# Patient Record
Sex: FEMALE | Race: BLACK OR AFRICAN AMERICAN | Employment: PART TIME | URBAN - METROPOLITAN AREA
[De-identification: names, ages, dates, MRNs, and addresses within clinical notes are randomized per-mention and may not be internally consistent; named-entity substitution may affect disease eponyms.]

---

## 2020-03-04 ENCOUNTER — HOSPITAL ENCOUNTER (OUTPATIENT)
Dept: MAMMOGRAPHY | Age: 60
Discharge: HOME | End: 2020-03-04
Attending: NURSE PRACTITIONER
Payer: COMMERCIAL

## 2020-03-04 DIAGNOSIS — D17.1 BREAST LIPOMA: ICD-10-CM

## 2020-03-04 PROCEDURE — 76642 ULTRASOUND BREAST LIMITED: CPT

## 2022-05-10 NOTE — PROGRESS NOTES
ASSESSMENT/PLAN:  Below is the assessment and plan developed based on review of pertinent history, physical exam, labs, studies, and medications. 1. Knee pain, unspecified chronicity, unspecified laterality      No follow-ups on file. In discussion with the patient, we considered the numerus possible diagnoses that could be contributing to their present symptoms. We also deliberated on the extensive management options that must be considered to treat their current condition. We reviewed their accessible prior medical records, diagnostic tests, and current health and employment information. We considered how these symptoms were affecting the patient´s activities of daily living as well as employment and fitness activities. The patient had various questions regarding the possible risks, benefits, complications, morbidity and mortality regarding their diagnosis and treatment options. The patients´ comorbidities were considered, and I advocated that they consider maximizing lifestyle modification through nutrition and exercise to aid in addressing their symptoms. Shared decision making yielded an understanding to move forward with conservation treatment preferences. The patient expressed understanding that if conservative management fails to alleviate the present symptoms they will return to office for re-evaluation and consideration of additional diagnostic tests and potential surgical options. In the interim, we have recommended ice, elevation, and take prescription anti-inflammatory medications along with a physician directed home exercise program. We discussed the risks and common side effects of anti-inflammatory medications and instructed the patient to discontinue the medication and contact us if they experienced any side effects. The patient was encouraged to discuss the possible side effects with their family physician or pharmacist prior to initiating any new medications.     We discussed the fact that many of the recommended treatment options presented are significantly limited by the patient´s social determinants of health. We also reviewed the circumstances surrounding the environment that they live and work which affect a wide range of health risk. We considered the limited access to appropriate educational resources regarding proper nutrition and exercise as well as the economic and social support necessary to maintain health and wellbeing. We discussed the possibility of an injection of a steroid mixed with a local anesthetic to relieve pain and decrease inflammation in the right knee. The risks of a steroid injection which include but are not limited to cartilage damage, death of nearby bone, joint infection, nerve damage, temporary facial flushing, temporary steroid flare of pain and inflammation in the joint, temporary increase in blood sugar, tendon weakening or rupture, thinning of nearby bone (osteoporosis), thinning of skin and soft tissue around the injection site, and whitening or lightening of the skin around the injection site were reviewed at length. We specifically advised that patients with diabetes talk to their primary care to ensure they are safe for a steroid injection due to the transient increase in blood sugar associated with the injection. We discussed the chance of increased bleeding and bruising if the patient is on blood thinners or certain dietary supplements that have a blood-thinning effect. We advised patients that have an active infection, history of allergic reactions to steroids or take medications that may prohibit them from receiving a steroid injection to talk to their primary care physician before receiving and injection. We also talked about limiting the number of injections because these potential side effects increase with larger doses and repeated use.     After explaining the risks and benefits of the procedure and obtaining verbal informed consent from the patient, the proposed area for injection was confirmed with the patient. After all questions and concerns were addressed, the skin was prepped with alcohol to reduce the chances of infection. The skin was anesthetized with a topical ethylene chloride spray and a mixture of two milliliters of Depo-Medrol (40mg/ml), one milliliter of Lidocaine and one milliliter of Marcaine was injected slowly into the intra-articular space of right knee in a sterile fashion without difficulty. The needle was removed and disposed of in a sterile container. The patient tolerated the injection well and a band-aid was placed on the skin. The patient was counseled on protecting the injection area, avoiding water submersion for 2 days, icing for pain relief and looking for signs and symptoms of infection. We requested that the patient contact us if any symptoms persist greater than 48 hours after the injection. 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 We will see how the right knee responds to an injection prior to proceeding with the left knee. Should continue her home exercise program.    SUBJECTIVE/OBJECTIVE:  Hiwot Joy (: 1960) is a 63 y.o. female, patient,here for evaluation of the No chief complaint on file. .   Of note, the patient was treated previously at [facility] extensively for patellofemoral arthritis of the knees. She continues to suffer from patellofemoral arthritis. She tried physical therapy as well as braces. She is taken multiple medications. She denies any swelling or locking or give way. She denies any numbness or tingling. She reports rest does make it better but active makes it worse. Physical Exam    Upon physical examination, the patient is well developed, well nourished, alert and oriented times three, with normal mood and affect and walks with an antalgic gait.     Upon examination of the right knee, the patient is nontender to palpation along the medial and lateral joint lines, and has no effusion. They are tender to palpation along the medial and lateral facets of the patella. They have crepitus of the patellofemoral joint with range of motion and discomfort with patella grind testing. The patient has no discomfort with Antonette´s maneuvers, and the knee is stable. They have full range of motion. They have 5/5 strength, and are neurovascularly intact distally. There is no erythema, warmth or skin lesions present. On examination of the contralateral extremity, the patient is nontender to palpation and has excellent range of motion, stability and strength. Imagin views of both knees demonstrate some early arthritic changes in the patellofemoral joint. No Known Allergies    Current Outpatient Medications   Medication Sig    ibuprofen (MOTRIN) 600 mg tablet Take 1 Tab by mouth every six (6) hours as needed for Pain.  HYDROcodone-acetaminophen (NORCO) 7.5-325 mg per tablet Take 1 Tab by mouth every six (6) hours as needed for Pain for 10 doses. No current facility-administered medications for this visit. No past medical history on file. Past Surgical History:   Procedure Laterality Date    HX OTHER SURGICAL      lump removal from her left breast       No family history on file.     Social History     Socioeconomic History    Marital status: SINGLE     Spouse name: Not on file    Number of children: Not on file    Years of education: Not on file    Highest education level: Not on file   Occupational History    Not on file   Tobacco Use    Smoking status: Never Smoker    Smokeless tobacco: Not on file   Substance and Sexual Activity    Alcohol use: No    Drug use: Not on file    Sexual activity: Not on file   Other Topics Concern    Not on file   Social History Narrative    Not on file     Social Determinants of Health     Financial Resource Strain:     Difficulty of Paying Living Expenses: Not on file   Food Insecurity:     Worried About [facility] in the Last Year: Not on file    Radha of Food in the Last Year: Not on file   Transportation Needs:     Lack of Transportation (Medical): Not on file    Lack of Transportation (Non-Medical): Not on file   Physical Activity:     Days of Exercise per Week: Not on file    Minutes of Exercise per Session: Not on file   Stress:     Feeling of Stress : Not on file   Social Connections:     Frequency of Communication with Friends and Family: Not on file    Frequency of Social Gatherings with Friends and Family: Not on file    Attends [employer] Services: Not on file    Active Member of [address] [place] [address] or Organizations: Not on file    Attends Club or Organization Meetings: Not on file    Marital Status: Not on file   Intimate Partner Violence:     Fear of Current or Ex-Partner: Not on file    Emotionally Abused: Not on file    Physically Abused: Not on file    Sexually Abused: Not on file   Housing Stability:     Unable to Pay for Housing in the Last Year: Not on file    Number of [place] in the Last Year: Not on file    Unstable Housing in the Last Year: Not on file       Review of Systems    No flowsheet data found. Vitals: There were no vitals taken for this visit. There is no height or weight on file to calculate BMI. An electronic signature was used to authenticate this note.   -- Merari Barbosa MD

## 2022-05-11 ENCOUNTER — OFFICE VISIT (OUTPATIENT)
Dept: ORTHOPEDIC SURGERY | Age: 62
End: 2022-05-11
Payer: COMMERCIAL

## 2022-05-11 VITALS — BODY MASS INDEX: 21.5 KG/M2 | WEIGHT: 133.8 LBS | HEIGHT: 66 IN

## 2022-05-11 DIAGNOSIS — M25.569 KNEE PAIN, UNSPECIFIED CHRONICITY, UNSPECIFIED LATERALITY: Primary | ICD-10-CM

## 2022-05-11 DIAGNOSIS — M22.41 CHONDROMALACIA OF PATELLOFEMORAL JOINT, RIGHT: ICD-10-CM

## 2022-05-11 PROCEDURE — 20610 DRAIN/INJ JOINT/BURSA W/O US: CPT | Performed by: ORTHOPAEDIC SURGERY

## 2022-05-11 PROCEDURE — 99204 OFFICE O/P NEW MOD 45 MIN: CPT | Performed by: ORTHOPAEDIC SURGERY

## 2022-05-11 RX ORDER — TRIAMCINOLONE ACETONIDE 40 MG/ML
40 INJECTION, SUSPENSION INTRA-ARTICULAR; INTRAMUSCULAR ONCE
Status: COMPLETED | OUTPATIENT
Start: 2022-05-11 | End: 2022-05-11

## 2022-05-11 RX ORDER — BUPIVACAINE HYDROCHLORIDE 5 MG/ML
2 INJECTION, SOLUTION EPIDURAL; INTRACAUDAL ONCE
Status: COMPLETED | OUTPATIENT
Start: 2022-05-11 | End: 2022-05-11

## 2022-05-11 RX ADMIN — BUPIVACAINE HYDROCHLORIDE 10 MG: 5 INJECTION, SOLUTION EPIDURAL; INTRACAUDAL at 17:01

## 2022-05-11 RX ADMIN — TRIAMCINOLONE ACETONIDE 40 MG: 40 INJECTION, SUSPENSION INTRA-ARTICULAR; INTRAMUSCULAR at 17:02

## 2022-11-17 NOTE — PROGRESS NOTES
Neurology Note    Patient ID:  Cristian Daniel  [id number]  64 y.o.  1960      Date of Consultation:  November 18, 2022    Referring Physician: Krystal Martini NP    Reason for Consultation: headaches      Assessment and Plan:    The patient is a pleasant 65-year-old female with a 40+ year history of chronic, intractable migraine headaches. Her neurological examination revealed no focal deficits. Prior neuroimaging was unremarkable. Chronic, intractable, episodic migraines:    Known triggers for her include stress and anxiety as well as noxious smells. She is struggling with her stress and depression. She became tearful multiple times throughout the visit today. Prior medications which have been tried include nortriptyline and Lyrica with no clear benefit. It is unclear how long she actually took those medications. I did discuss with her treatment of migraines including both prophylactic and abortive care. I discussed different medications for prophylaxis. Is unclear how long she actually took the nortriptyline and Lyrica for. She is not interested in taking a medication daily. After lengthy discussion, I do think that her stress and depression is contributing a lot to her symptoms and she would like to proceed with treatment of this first and see how her headaches are after this treatment. I did give her information about different counseling that could be available. She will schedule a visit with her primary care provider to discuss medications for her depression. Risk factors for migraines were reviewed with the patient. These include lifestyle modifications, improving exercise, receiving adequate sleep, and monitoring for food triggers. A patient log of migraine frequency, intensity, and possible triggers should be recorded. Lumbar degenerative spine disease: There is no surgical intervention. Arthritis             Subjective: I have headaches.         History of Present Illness:   Blayne Rodríguez is a 63 y.o. female who was referred to the neurology clinic at [facility] for an evaluation of headaches. Patient reports that the headaches are predominantly left-sided. They are associated with photophobia and sonophobia. She does have nausea associated with them but does not vomit. They are variable in their frequency and duration. She can have them daily for 1 to 2 weeks only have them a couple times a month. They can last for few hours to all day. Stress and anxiety are a big trigger for her. Perfume can also be a trigger. They have started in her teenage years and have been constant since then. She is not sure what medicines have been tried but none have provided her any relief. She becomes tearful during the visit today due to multiple stressors in her life. She has not gotten over the death of her parents. Her work also causes her a tremendous amount of stress. When talking about work today she becomes very tearful. Upon review of the medical records, the patient was being followed by neurology at [facility]. She was last seen in clinic on February 10, 2022. At that time she was being followed by a peripheral neuropathy as well as headaches. It appears that she had been prescribed nortriptyline to help with headaches but she had not picked up the medication. She did have an EMG/nerve conduction study which revealed chronic changes in the vastus lateralis and tibialis anterior. MRI of her lumbar spine revealed degenerative changes which was worst at L4-L5. It was felt that the patient had a length dependent small fiber neuropathy as well as mild degenerative disease of her lumbar spine. At that time she was started on Lyrica to help with her pain. Laboratory test for small fiber neuropathy including HIV, hemoglobin A1c, SPEP, HILDA and rheumatoid factor were all negative. Reports that she still does have numbness and tingling in her feet. She did not take the Lyrica as she did not feel it was helping. She is uncertain as to how long she did try the nortriptyline for. She still does have significant amount of back pain which she has had since 2016. There is no surgery that was required. She reports that she does have a very erratic sleep schedule. She has very little in the way of caffeine use. She does not have a dedicated exercise program.  She has significant arthritis pain in her knees as well as in her arms. Past Medical History:   Diagnosis Date    Arthritis     Hypertension  Knee arthritis - wears knee braces on both sides. Past Surgical History:   Procedure Laterality Date    HX OTHER SURGICAL      lump removal from her left breast        Family History   Problem Relation Age of Onset    Heart Attack Mother     Pneumonia Father     No history of headaches in her family. Mother - diabetes. Social History     Tobacco Use    Smoking status: Never    Smokeless tobacco: Never   Substance Use Topics    Alcohol use: No        Allergies   Allergen Reactions    Tramadol Other (comments)     Reaction Type: Allergy; Severity: Severe; Reaction(s): Cold sweat,Dizziness,Nausea and vomiting      Candesartan Other (comments)     Reaction Type: Allergy; Reaction(s): epigastric pain    Lisinopril Other (comments)     Reaction Type: Allergy; Reaction(s): lip blistering    Naproxen Other (comments)     Reaction Type: Allergy; Reaction(s): Aching headache        Prior to Admission medications    Medication Sig Start Date End Date Taking? Authorizing Provider   ascorbic acid, vitamin C, (Vitamin C) 500 mg tablet Take  by mouth. Yes Provider, Historical   cyanocobalamin (Vitamin B-12) 1,000 mcg tablet Take 1,000 mcg by mouth daily. Yes Provider, Historical   multivitamin (MULTIPLE VITAMIN PO) Take  by mouth.    Yes Provider, Historical       Review of Systems:    General, constitutional: negative  Eyes, vision: negative  Ears, nose, throat: negative  Cardiovascular, heart: negative  Respiratory: negative  Gastrointestinal: negative  Genitourinary: negative  Musculoskeletal: negative  Skin and integumentary: negative  Psychiatric: negative  Endocrine: negative  Neurological: negative, except for HPI  Hematologic/lymphatic: negative  Allergy/immunology: negative      Objective:     Visit Vitals  /80 (BP 1 Location: Left upper arm, BP Patient Position: Sitting, BP Cuff Size: Large adult)   Pulse 86   Temp 98.2 °F (36.8 °C) (Temporal)   Resp 16   Ht 5' 6\" (1.676 m)   Wt 141 lb 9.6 oz (64.2 kg)   SpO2 97%   BMI 22.85 kg/m²       Physical Exam:    General:  appears well nourished in no acute distress. She has a very flat affect and is depressed. She becomes tearful multiple times during the examination  Neck: no carotid bruits  Lungs: clear to auscultation  Heart:  no murmurs, regular rate  Lower extremity: peripheral pulses palpable and no edema  Skin: intact    Neurological exam:    Awake, alert, oriented to person, place and time  Recent and remote memory were normal  Attention and concentration were intact  Language was intact. There was no aphasia  Speech: no dysarthria  Fund of knowledge was preserved    Cranial nerves:   II-XII were tested    Perrrla  Fundoscopic examination revealed venous pulsations and no clear abnormalities  Visual fields were full  Eomi, no evidence of nystagmus  Facial sensation:  normal and symmetric  Facial motor: normal and symmetric  Hearing intact  SCM strength intact  Tongue: midline without fasciculations    Motor: Tone normal  Pronator drift was absent  No evidence of fasciculations    Strength testing:   deltoid triceps biceps Wrist ext. Wrist flex. intrinsics Hip flex. Hip ext. Knee ext.   Knee flex Dorsi flex Plantar flex   Right 5 5 5 5 5 5 5 5 5 5 5 5   Left 5 5 5 5 5 5 5 5 5 5 5 5     She does have good strength but difficulty in sustaining a contraction due to diffuse pain in her joints and throughout her arms and legs. Sensory:  Upper extremity: intact to pp, light touch, and vibration > 10 seconds  Lower extremity: intact to pp, light touch, and vibration > 10 seconds. I did not appreciate a sensory deficit in her lower extremities. Reflexes:    Right Left  Biceps  2 2  Triceps 2 2  Brachiorad. 2 2  Patella  NT NT  Achilles 2 2    Patellar reflexes not tested as patient has significant arthritis with braces on does not want to be tested. Plantar response:  flexor bilaterally      Cerebellar testing:  no tremor apparent, finger/nose and twan were intact    Romberg: absent      Gait: steady. Slow. Antalgic. She does use a cane. Labs:     No results found for: HBA1C, NA, K, CL, GLU, BUN, CREA, CA, WBC, HCT, HGB, PLT, LDL, WAT4GYBG, HCTEXT, HGBEXT, PLTEXT, IGQ4YKAM, HCTEXT, HGBEXT, PLTEXT    Imaging:    No results found for this or any previous visit. No results found for this or any previous visit. There is no problem list on file for this patient. The patient should return to clinic in 6 to 9 months    Renewed medication: None    I spent  60  minutes on the day of the encounter preparing the office visit by reviewing medical records, obtaining a history, performing examination, counseling and educating the patient on diagnosis, documenting in the clinical medical record, and coordinating the care for the patient. The patient had the ability to ask questions and all questions were answered.           Signed By:  Bre Carvajal DO FAAN    November 18, 2022

## 2022-11-18 ENCOUNTER — OFFICE VISIT (OUTPATIENT)
Dept: NEUROLOGY | Age: 62
End: 2022-11-18
Payer: COMMERCIAL

## 2022-11-18 VITALS
HEART RATE: 86 BPM | TEMPERATURE: 98.2 F | RESPIRATION RATE: 16 BRPM | OXYGEN SATURATION: 97 % | SYSTOLIC BLOOD PRESSURE: 120 MMHG | BODY MASS INDEX: 22.76 KG/M2 | HEIGHT: 66 IN | DIASTOLIC BLOOD PRESSURE: 80 MMHG | WEIGHT: 141.6 LBS

## 2022-11-18 DIAGNOSIS — G43.019 INTRACTABLE MIGRAINE WITHOUT AURA AND WITHOUT STATUS MIGRAINOSUS: Primary | ICD-10-CM

## 2022-11-18 DIAGNOSIS — F32.A DEPRESSION, UNSPECIFIED DEPRESSION TYPE: ICD-10-CM

## 2022-11-18 PROCEDURE — 99205 OFFICE O/P NEW HI 60 MIN: CPT | Performed by: PSYCHIATRY & NEUROLOGY

## 2022-11-18 RX ORDER — LANOLIN ALCOHOL/MO/W.PET/CERES
1000 CREAM (GRAM) TOPICAL DAILY
COMMUNITY

## 2022-11-18 RX ORDER — ASCORBIC ACID 500 MG
TABLET ORAL
COMMUNITY

## 2022-11-18 NOTE — LETTER
11/18/2022    Patient: Cristian Daniel   YOB: 1960   Date of Visit: 11/18/2022     Krystal Martini NP  [facility] [address] [place] [id number]  Via Fax: [phone]    Dear Krystal Martini NP,      Thank you for referring Ms. Wes Arevalo to [address] [place] [address] for evaluation. My notes for this consultation are attached. If you have questions, please do not hesitate to call me. I look forward to following your patient along with you.       Sincerely,    Dontae Adams, DO

## 2022-12-08 ENCOUNTER — TRANSCRIBE ORDER (OUTPATIENT)
Dept: SCHEDULING | Age: 62
End: 2022-12-08

## 2022-12-08 DIAGNOSIS — R10.30 LOWER ABDOMINAL PAIN: Primary | ICD-10-CM

## 2023-03-29 ENCOUNTER — TRANSCRIBE ORDER (OUTPATIENT)
Dept: SCHEDULING | Age: 63
End: 2023-03-29

## 2023-04-07 ENCOUNTER — TRANSCRIBE ORDER (OUTPATIENT)
Dept: SCHEDULING | Age: 63
End: 2023-04-07

## 2023-05-26 RX ORDER — ASCORBIC ACID 500 MG
TABLET ORAL
COMMUNITY

## 2023-08-08 ENCOUNTER — OFFICE VISIT (OUTPATIENT)
Age: 63
End: 2023-08-08
Payer: COMMERCIAL

## 2023-08-08 VITALS
BODY MASS INDEX: 25.07 KG/M2 | HEART RATE: 78 BPM | OXYGEN SATURATION: 97 % | WEIGHT: 156 LBS | DIASTOLIC BLOOD PRESSURE: 74 MMHG | HEIGHT: 66 IN | RESPIRATION RATE: 15 BRPM | SYSTOLIC BLOOD PRESSURE: 110 MMHG

## 2023-08-08 DIAGNOSIS — G47.00 INSOMNIA, UNSPECIFIED TYPE: ICD-10-CM

## 2023-08-08 DIAGNOSIS — G43.809 OTHER MIGRAINE WITHOUT STATUS MIGRAINOSUS, NOT INTRACTABLE: Primary | ICD-10-CM

## 2023-08-08 DIAGNOSIS — G62.9 PERIPHERAL POLYNEUROPATHY: ICD-10-CM

## 2023-08-08 PROBLEM — G43.909 MIGRAINE: Status: ACTIVE | Noted: 2023-08-08

## 2023-08-08 PROCEDURE — 99215 OFFICE O/P EST HI 40 MIN: CPT

## 2023-08-08 RX ORDER — VITAMIN E 268 MG
400 CAPSULE ORAL DAILY
COMMUNITY

## 2023-08-08 RX ORDER — RIMEGEPANT SULFATE 75 MG/75MG
75 TABLET, ORALLY DISINTEGRATING ORAL DAILY PRN
Qty: 16 TABLET | Refills: 11 | Status: SHIPPED | OUTPATIENT
Start: 2023-08-08

## 2023-08-08 ASSESSMENT — PATIENT HEALTH QUESTIONNAIRE - PHQ9
SUM OF ALL RESPONSES TO PHQ QUESTIONS 1-9: 0
SUM OF ALL RESPONSES TO PHQ QUESTIONS 1-9: 0
2. FEELING DOWN, DEPRESSED OR HOPELESS: 0
SUM OF ALL RESPONSES TO PHQ9 QUESTIONS 1 & 2: 0
SUM OF ALL RESPONSES TO PHQ QUESTIONS 1-9: 0
SUM OF ALL RESPONSES TO PHQ QUESTIONS 1-9: 0
1. LITTLE INTEREST OR PLEASURE IN DOING THINGS: 0

## 2023-08-08 ASSESSMENT — ENCOUNTER SYMPTOMS
ALLERGIC/IMMUNOLOGIC NEGATIVE: 1
ABDOMINAL PAIN: 1
RESPIRATORY NEGATIVE: 1
PHOTOPHOBIA: 1

## 2023-08-08 NOTE — ASSESSMENT & PLAN NOTE
Patient verbalized experiencing insomnia due to abdominal pain. She has been followed by GI at [facility] for abdominal issues. She is to continue to follow with GI as appropriate. For insomnia, patient does not like to take medications. Patient was advised to try melatonin over-the-counter to see if that helps. She can try melatonin 3 mg and can titrate up to 9 mg or she can try melatonin 5 mg and can titrate up to 10 mg. She verbalized understanding and agreed with plan of care. Sleep hygiene was discussed with patient. If patient continues to experience sleep disturbances, may consider referral to pulmonology for evaluation of sleep apnea. Sleep Hygiene Recommendations  i. Avoid caffeine within 4-6 hours of bedtime  ii. Avoid the use of nicotine close to bedtime or during the night  iii. Do not drink alcoholic beverages within 4-6 hours of bedtime  iv. While a light snack can promote sound sleep, avoid large meals 2-3 hours before bedtime  v. Minimize light, noise, and extreme temperature in the bedroom. Stimulus Control Recommendations:   i. Lie down at night - only when sleepy  ii. Use the bed only for actual sleep (or sex) - not reading, watching TV, etc.  iii. Get out of bed if you wake up at night & cannot fall back asleep, return to bed when sleepy;  do not remain in bed awake for longer than 10 - 15 minutes  iv. Avoid napping during the day  v. Adhere to a strict morning rising time, regardless of how much sleep you got the night before.

## 2023-08-08 NOTE — ASSESSMENT & PLAN NOTE
Patient continues to experience migraine once a month and last several hours to 2 days. She denied starting on a preventative at this time. Since she has 1 migraine headache per month, we will start her on Nurtec 75 mg as needed as an abortive. Triptans were considered but deferred due to patient's history of chest pain. No additional imaging recommendation needed at this time. I personally reviewed patient's imaging from [facility]. - Head CT without contrast completed on 2/10/2023 was unremarkable. - Brain MRI without contrast COVID on 6/22 2022 showed no acute intracranial abnormality. We discussed the importance of a proper diet including plenty of fruits and vegetables as this can help with headache prevention. We discussed the importance of staying hydrated and drinking at least 32 to 64 ounces of water daily as this can be a cause of tension type headaches. We also discussed the importance of keeping a headache journal or log to identify triggers.     We discussed the importance of sleep hygiene and attempting to get at least 6 to 8 hours of sleep daily to help with headache prevention

## 2023-08-08 NOTE — ASSESSMENT & PLAN NOTE
Patient has been followed for neuropathy at [facility]. She verbalized this hospital is far from her house. Will defer neuropathy management to [facility] and patient is to continue to follow-up with [facility] neurology as appropriate. Fall safety was discussed with patient. 26-Feb-2020 16:28

## 2023-08-08 NOTE — PATIENT INSTRUCTIONS
As per our discussion,    I understand you do not want to take any medication for your migraine headaches. Since you only have 1 migraine per month, we will try to put you on Nurtec 75 mg as needed. This will need to get authorized by your insurance so he may take it in a while. If the headaches are getting worse and more frequent, may consider starting you on a medication in which she would take every day as a preventative. As for your sleep difficulty, I would advise you that you try melatonin over-the-counter. You can use melatonin 3 mg or 5 mg over-the-counter to see if that helps. As we discussed,  The importance of a proper diet including plenty of fruits and vegetables as this can help with headache prevention. The importance of staying hydrated and drinking at least 32 to 64 ounces of water daily as this can be a cause of tension type headaches. The importance of keeping a headache journal or log to identify triggers. The importance of sleep hygiene and attempting to get at least 6 to 8 hours of sleep daily to help with headache prevention                                  Sleep Hygiene Recommendations  Avoid caffeine within 4-6 hours of bedtime  Avoid the use of nicotine close to bedtime or during the night  Do not drink alcoholic beverages within 4-6 hours of bedtime  While a light snack can promote sound sleep, avoid large meals 2-3 hours before bedtime  Minimize light, noise, and extreme temperature in the bedroom. Stimulus Control Recommendations:   Lie down at night - only when sleepy  Use the bed only for actual sleep (or sex) - not reading, watching TV, etc.  Get out of bed if you wake up at night & cannot fall back asleep, return to bed when sleepy;  do not remain in bed awake for longer than 10 - 15 minutes  Avoid napping during the day  Adhere to a strict morning rising time, regardless of how much sleep you got the night before.      It was a pleasure

## 2023-08-08 NOTE — PROGRESS NOTES
1. Have you been to the ER, urgent care clinic since your last visit? Hospitalized since your last visit? No.    2. Have you seen or consulted any other health care providers outside of the [address] [place] [address] since your last visit? Include any pap smears or colon screening. [place] [facility]         Chief Complaint   Patient presents with    Migraine     Notices headaches start if not sleeping, or not eating, or strong smells
for this visit. Past medical history/surgical history, family history, and social history have been reviewed for today's visit      Review of Systems   Constitutional: Negative. HENT: Negative. Eyes:  Positive for photophobia (with headaches). Respiratory: Negative. Cardiovascular:  Positive for chest pain. Gastrointestinal:  Positive for abdominal pain (EGD test done). Endocrine: Negative. Genitourinary: Negative. Musculoskeletal:  Positive for neck pain (with headaches). Skin: Negative. Allergic/Immunologic: Negative. Neurological:  Positive for weakness (bilateral lower extremity) and headaches. Psychiatric/Behavioral:  Positive for sleep disturbance. A ten system review of constitutional, cardiovascular, respiratory, musculoskeletal, endocrine, skin, SHEENT, genitourinary, psychiatric and neurologic systems was obtained and is unremarkable except as mentioned under HPI          EXAMINATION:     Vitals:    08/08/23 1503   BP: 110/74   Site: Left Upper Arm   Position: Sitting   Cuff Size: Medium Adult   Pulse: 78   Resp: 15   SpO2: 97%   Weight: 156 lb (70.8 kg)   Height: 5' 6\" (1.676 m)          General appearance: Patient is well-developed and well-nourished in no apparent distress and well groomed. Psych/mental health:  Affect: Appropriate    PHQ  No flowsheet data found.     HEENT:   Normocephalic  With evidence of trauma: No  Full range of motion head neck: Yes  Tenderness to palpation of the head neck region: No      Cardiovascular:     Extremities warm to touch: Yes  Extremity swelling: No  Discoloration: No  Evidence of PVD: No    Respiratory:   Dyspnea on exertion: No   Abnormal effort on casual observation: No   Use of portable oxygen: No   Evidence of cyanosis: No     Musculoskeletal:   Evidence of significant bone deformities: No   Spinal curvature: No     Integumentary:    Obvious bruising: No   Lacerations or discoloration on casual observation: No
Poor

## 2023-08-09 ENCOUNTER — TELEPHONE (OUTPATIENT)
Age: 63
End: 2023-08-09

## 2023-08-09 NOTE — TELEPHONE ENCOUNTER
Nurtec    Her PBM is [employer] - however it is managed by [facility] and they require a paper fax copy to be sent to them. Rep is faxing the form to me today.

## 2023-08-10 ENCOUNTER — TELEPHONE (OUTPATIENT)
Age: 63
End: 2023-08-10

## 2023-08-10 NOTE — TELEPHONE ENCOUNTER
Nurtec PA sent by fax manually to [facility] [phone]    For status may call [phone] option 3     Nurtec approved and noted in previous documentation

## 2023-08-18 ENCOUNTER — TELEPHONE (OUTPATIENT)
Age: 63
End: 2023-08-18

## 2023-08-18 NOTE — TELEPHONE ENCOUNTER
Nurtec approval letter rec'd from [facility] /Fax [phone]    Date range 8/10/23 to 12/31/2039    Letter scanned to [employer].

## 2024-11-12 ENCOUNTER — TELEPHONE (OUTPATIENT)
Age: 64
End: 2024-11-12

## 2025-07-14 ENCOUNTER — OFFICE VISIT (OUTPATIENT)
Age: 65
End: 2025-07-14
Payer: MEDICAID

## 2025-07-14 VITALS
DIASTOLIC BLOOD PRESSURE: 68 MMHG | BODY MASS INDEX: 24.11 KG/M2 | HEIGHT: 66 IN | RESPIRATION RATE: 14 BRPM | HEART RATE: 65 BPM | OXYGEN SATURATION: 99 % | SYSTOLIC BLOOD PRESSURE: 108 MMHG | WEIGHT: 150 LBS

## 2025-07-14 DIAGNOSIS — G43.809 OTHER MIGRAINE WITHOUT STATUS MIGRAINOSUS, NOT INTRACTABLE: Primary | ICD-10-CM

## 2025-07-14 DIAGNOSIS — Z00.00 PREVENTATIVE HEALTH CARE: ICD-10-CM

## 2025-07-14 DIAGNOSIS — G47.00 INSOMNIA, UNSPECIFIED TYPE: ICD-10-CM

## 2025-07-14 DIAGNOSIS — G62.9 PERIPHERAL POLYNEUROPATHY: ICD-10-CM

## 2025-07-14 PROCEDURE — 99215 OFFICE O/P EST HI 40 MIN: CPT

## 2025-07-14 RX ORDER — LIDOCAINE 50 MG/G
1 PATCH TOPICAL EVERY 24 HOURS
COMMUNITY
Start: 2025-02-18

## 2025-07-14 RX ORDER — RIMEGEPANT SULFATE 75 MG/75MG
75 TABLET, ORALLY DISINTEGRATING ORAL DAILY PRN
Qty: 16 TABLET | Refills: 11 | Status: SHIPPED | OUTPATIENT
Start: 2025-07-14

## 2025-07-14 ASSESSMENT — ENCOUNTER SYMPTOMS
ABDOMINAL PAIN: 1
ALLERGIC/IMMUNOLOGIC NEGATIVE: 1
RESPIRATORY NEGATIVE: 1
EYES NEGATIVE: 1

## 2025-07-14 ASSESSMENT — PATIENT HEALTH QUESTIONNAIRE - PHQ9
SUM OF ALL RESPONSES TO PHQ QUESTIONS 1-9: 0
2. FEELING DOWN, DEPRESSED OR HOPELESS: NOT AT ALL
SUM OF ALL RESPONSES TO PHQ QUESTIONS 1-9: 0
SUM OF ALL RESPONSES TO PHQ QUESTIONS 1-9: 0
1. LITTLE INTEREST OR PLEASURE IN DOING THINGS: NOT AT ALL
SUM OF ALL RESPONSES TO PHQ QUESTIONS 1-9: 0

## 2025-07-14 NOTE — PROGRESS NOTES
1. Have you been to the ER, urgent care clinic since your last visit?  Hospitalized since your last visit?  No.    2. Have you seen or consulted any other health care providers outside of the [facility] since your last visit?  Include any pap smears or colon screening.   No.        Chief Complaint   Patient presents with    Migraine     Itching scalp can bring on Migraines- has made an appt with Dermatology        
well-developed and well-nourished in no apparent distress and well groomed.    Psych/mental health:  Affect: Appropriate    PHQ       No data to display                HEENT:   Normocephalic  With evidence of trauma: No  Full range of motion head neck: Yes  Tenderness to palpation of the head neck region: No      Cardiovascular:     Extremities warm to touch: Yes  Extremity swelling: No  Discoloration: No  Evidence of PVD: No    Respiratory:   Dyspnea on exertion: No   Abnormal effort on casual observation: No   Use of portable oxygen: No   Evidence of cyanosis: No     Musculoskeletal:   Evidence of significant bone deformities: No   Spinal curvature: No     Integumentary:    Obvious bruising: No   Lacerations or discoloration on casual observation: No       Neurological Examination:   Mental Status:        MMSE  Failed to redirect to the Timeline version of the [facility].     Formal testing was not completed    there was nothing concerning on general observation and discussion.   Alert oriented and appropriate to general conversation  Normal processing on general observation  Followed conversation and responded seemingly appropriate throughout the office visit  No word finding difficulties noted on casual observation  Able to follow directions without difficulty     Cranial Nerves:    EOMs intact gaze is conjugate  No nystagmus is appreciated  Facial motor intact bilaterally  Hearing intact to conversation  Voice with normal projection, no evidence of secretion pooling  Shoulder shrug intact bilaterally  No tongue deviation appreciated     Motor:   Normal bulk  No tremor appreciated on today's exam  No abnormal movements appreciated on today's exam  Moves extremities spontaneously and with purpose  5/5 x bilateral upper extremity and left lower extremity  4/5 in the right lower extremity      Sensation: Intact to light touch    Coordination/Cerebellar: Finger-to-nose intact bilateral    Gait: Ambulates using

## 2025-07-14 NOTE — PATIENT INSTRUCTIONS
As per our discussion,    Overall, your migraine headaches have been stable on current regimen.    I will not make any changes in medication at this time.  Will continue Nurtec 75 mg as needed.  A new refill for Nurtec has been prescribed today and will await sweaty, you can pick it up from your pharmacy.    As we discussed,  The importance of a proper diet including plenty of fruits and vegetables as this can help with headache prevention.  The importance of staying hydrated and drinking at least 32 to 64 ounces of water daily as this can be a cause of tension type headaches.  The importance of keeping a headache journal or log to identify triggers.  The importance of sleep hygiene and attempting to get at least 6 to 8 hours of sleep daily to help with headache prevention.    I would encourage that you continue to follow-up with your primary care provider and your other specialists as appropriate.    Continue to remain active.  Adopt healthy lifestyles which include nutrition and exercise to help relieve your neuropathic symptoms.  Please take your time when you are walking or when you are sitting position to prevent falls.    I would encourage that you continue to follow-up with your dermatology appointment as appropriate.    It was a pleasure to see you today    Will see you back in a year or sooner if needed.    Please do not hesitate to reach out for any questions or concerns.

## 2025-07-15 ENCOUNTER — TELEPHONE (OUTPATIENT)
Age: 65
End: 2025-07-15

## 2025-07-15 NOTE — ASSESSMENT & PLAN NOTE
Patient verbalized experiencing insomnia due to abdominal pain.    She reports ongoing abdominal pain, which has been attributed to acid reflux.  She reported she had a scope that revealed a cyst in her stomach, but it was determined not to be the cause of her pain.    This has been monitored by specialist- no acute findings meriting change in the plan.    She is to continue to follow-up with GI at [facility]  as appropriate.    Sleep hygiene was discussed with patient.

## 2025-07-15 NOTE — ASSESSMENT & PLAN NOTE
Patient reports experiencing migraines approximately twice per week , with some weeks being symptom free.    She also reports persistent scalp itching that exacerbates her headaches when she scratches.  She has an upcoming appointment with a dermatologist at the end of this month.  Patient was encouraged to keep that appointment.    She is not currently taking any migraine medication.    Given the frequency of her headaches, a prescription for Nurtec 75 mg as needed will be provided.      She deferred any preventative medication options at this time.    We discussed the importance of a proper diet including plenty of fruits and vegetables as this can help with headache prevention.    We discussed the importance of staying hydrated and drinking at least 32 to 64 ounces of water daily as this can be a cause of tension type headaches.    We also discussed the importance of keeping a headache journal or log to identify triggers.    We discussed the importance of sleep hygiene and attempting to get at least 6 to 8 hours of sleep daily to help with headache prevention.

## 2025-07-15 NOTE — ASSESSMENT & PLAN NOTE
Patient denied any worsening of symptoms.    She continues to experience numbness and tingling in her extremities.    She is using lidocaine patches for pain management.    This has been monitored by specialist- no acute findings meriting change in the plan.    Patient is to continue using the patches as needed and to follow-up with a specialist if symptoms persist or worsen.    She is to remain active.  Adopt healthy lifestyles which include nutrition and exercise to help alleviate her neuropathic symptoms.    Fall safety was discussed with patient.

## 2025-07-15 NOTE — ASSESSMENT & PLAN NOTE
Patient is inquiring about when she needs to do her Pap smear.    Patient was advised to consult her primary care provider regarding the scheduling of her next Pap smear.

## 2025-07-25 ENCOUNTER — TELEPHONE (OUTPATIENT)
Age: 65
End: 2025-07-25

## 2025-07-25 NOTE — TELEPHONE ENCOUNTER
Called pt and left voice message to call me back regarding medication. Advised her I also sent her a my chart message regarding this matter on 7/16/25 if she wants to look at that and respond to me.

## 2025-07-25 NOTE — TELEPHONE ENCOUNTER
Returned call,verified pt with two pt identifiers, advised pt that her Nurtec was denied thru her insurance due to not trying a Triptan medication first. Advised that since pt has a Hx of chest pain, cardiac Hx, it is contraindicated for her to use.  Romie said she can prescribe something to try or she can appeal the Nurtec. Which would pt like for her to do? Pt said she could try something else. Advised I will send Moshebecky a message and I will call her back next week with what she sends in. Pt verbalized understanding.

## 2025-07-28 DIAGNOSIS — G43.809 OTHER MIGRAINE WITHOUT STATUS MIGRAINOSUS, NOT INTRACTABLE: Primary | ICD-10-CM

## 2025-07-28 RX ORDER — UBROGEPANT 100 MG/1
100 TABLET ORAL DAILY PRN
Qty: 16 TABLET | Refills: 5 | Status: ACTIVE | OUTPATIENT
Start: 2025-07-28

## 2025-07-28 NOTE — PROGRESS NOTES
Patient has been taking Nurtec 75 mg as needed.  However, her insurance denied approval.  It was required for patient to try to triptans before taking Nurtec.  Patient is not a candidate for triptans due to history of chest pain.  Will go ahead and try Ubrelvy to see if this could be approved by insurance.

## 2025-07-29 NOTE — TELEPHONE ENCOUNTER
Message  Received: Yesterday  Romie Patel, YAMILE - NP  Mary Cristina, LPN  Caller: Unspecified (4 days ago,  9:14 AM)  Mary,    I went ahaead and switched her to Ubrelvy and hopefully her insurance approve this.  She is not a candidate for triptans due to history of chest pain.    Thank you,  Amanda        Called pt,verified pt with two pt identifiers, advised that Romie sent in Ubrelvy for her to try. Advised hopefully the insurance will approve the medication. Advised pt if she does not hear anything from pharmacy in a week to call me back and let me know. I'll look into it. Pt verbalized understanding.

## 2025-08-13 PROBLEM — Z00.00 PREVENTATIVE HEALTH CARE: Status: RESOLVED | Noted: 2025-07-14 | Resolved: 2025-08-13
